# Patient Record
Sex: FEMALE | NOT HISPANIC OR LATINO | Employment: FULL TIME | URBAN - METROPOLITAN AREA
[De-identification: names, ages, dates, MRNs, and addresses within clinical notes are randomized per-mention and may not be internally consistent; named-entity substitution may affect disease eponyms.]

---

## 2025-02-09 ENCOUNTER — OFFICE VISIT (OUTPATIENT)
Dept: URGENT CARE | Age: 35
End: 2025-02-09
Payer: COMMERCIAL

## 2025-02-09 VITALS
TEMPERATURE: 98.4 F | SYSTOLIC BLOOD PRESSURE: 102 MMHG | RESPIRATION RATE: 16 BRPM | WEIGHT: 137 LBS | DIASTOLIC BLOOD PRESSURE: 73 MMHG | OXYGEN SATURATION: 97 % | HEART RATE: 98 BPM

## 2025-02-09 DIAGNOSIS — R68.89 FLU-LIKE SYMPTOMS: Primary | ICD-10-CM

## 2025-02-09 LAB
POC RAPID INFLUENZA A: POSITIVE
POC RAPID INFLUENZA B: NEGATIVE

## 2025-02-09 PROCEDURE — 87804 INFLUENZA ASSAY W/OPTIC: CPT

## 2025-02-09 PROCEDURE — 99203 OFFICE O/P NEW LOW 30 MIN: CPT

## 2025-02-09 NOTE — PATIENT INSTRUCTIONS
Rest at home, increase fluids, go to emergency department with worsening symptoms, follow up with primary care doctor.

## 2025-02-09 NOTE — PROGRESS NOTES
Subjective   Patient ID: Jenae Bazan is a 34 y.o. female. They present today with a chief complaint of Headache, Fever, and Dizziness (Came in contact with flu A).    History of Present Illness  HPI    Past Medical History  Allergies as of 02/09/2025    (No Known Allergies)       (Not in a hospital admission)       No past medical history on file.    No past surgical history on file.         Review of Systems  Review of Systems                               Objective    Vitals:    02/09/25 1031   BP: 102/73   BP Location: Left arm   Patient Position: Sitting   BP Cuff Size: Large adult   Pulse: 98   Resp: 16   Temp: 36.9 °C (98.4 °F)   TempSrc: Temporal   SpO2: 97%   Weight: 62.1 kg (137 lb)     No LMP recorded.    Physical Exam    Procedures    Point of Care Test & Imaging Results from this visit  Results for orders placed or performed in visit on 02/09/25   POCT Influenza A/B manually resulted   Result Value Ref Range    POC Rapid Influenza A Positive (A) Negative    POC Rapid Influenza B Negative Negative      No results found.    Diagnostic study results (if any) were reviewed by CHUNG Gonsalez.    Assessment/Plan   Allergies, medications, history, and pertinent labs/EKGs/Imaging reviewed by CHUNG Gonsalez.     Medical Decision Making  ***    Orders and Diagnoses  Diagnoses and all orders for this visit:  Flu-like symptoms  -     POCT Influenza A/B manually resulted      Medical Admin Record      Patient disposition: { Disposition:[id number]}    Electronically signed by CHUNG Gonsalez  10:46 AM       than 2 seconds.   Neurological:      General: No focal deficit present.      Mental Status: She is alert and oriented to person, place, and time.   Psychiatric:         Mood and Affect: Mood normal.         Behavior: Behavior normal.         Procedures    Point of Care Test & Imaging Results from this visit  Results for orders placed or performed in visit on 02/09/25   POCT Influenza A/B manually resulted   Result Value Ref Range    POC Rapid Influenza A Positive (A) Negative    POC Rapid Influenza B Negative Negative      No results found.    Diagnostic study results (if any) were reviewed by CHUNG Gonsalez.    Assessment/Plan   Allergies, medications, history, and pertinent labs/EKGs/Imaging reviewed by CHUNG Gonsalez.     Medical Decision Making  34-year-old female presents with fever congestion body aches 3 to 4 days.  She reports she was exposed to influenza A patient denies headache dizziness or chest pain denies shortness of breath on exam patient is in no acute distress vital signs are stable heart rate is regular lungs are clear bilaterally.  Patient is nontoxic-appearing influenza is positive today due to patient symptoms 3 to 4 days likely out of the window for Tamiflu patient has mild symptoms patient is to rest at home take Tylenol ibuprofen as needed Go to the emergency department with worsening symptoms patient reports she is drinking fluids and eating and urinating appropriately monitor symptoms follow-up with primary care doctor patient agrees with plan of care patient left in stable condition    Orders and Diagnoses  Diagnoses and all orders for this visit:  Flu-like symptoms  -     POCT Influenza A/B manually resulted      Medical Admin Record      Patient disposition: Home    Electronically signed by CHUNG Gonsalez  10:46 AM

## 2025-02-27 ASSESSMENT — ENCOUNTER SYMPTOMS
HEADACHES: 1
FEVER: 1